# Patient Record
Sex: FEMALE | Race: BLACK OR AFRICAN AMERICAN | NOT HISPANIC OR LATINO | URBAN - METROPOLITAN AREA
[De-identification: names, ages, dates, MRNs, and addresses within clinical notes are randomized per-mention and may not be internally consistent; named-entity substitution may affect disease eponyms.]

---

## 2019-05-17 ENCOUNTER — OFFICE (OUTPATIENT)
Dept: URBAN - METROPOLITAN AREA CLINIC 101 | Facility: CLINIC | Age: 54
End: 2019-05-17

## 2019-05-17 ENCOUNTER — OFFICE (OUTPATIENT)
Dept: URBAN - METROPOLITAN AREA CLINIC 101 | Facility: CLINIC | Age: 54
End: 2019-05-17
Payer: MEDICAID

## 2019-05-17 VITALS
WEIGHT: 234 LBS | SYSTOLIC BLOOD PRESSURE: 127 MMHG | DIASTOLIC BLOOD PRESSURE: 91 MMHG | HEART RATE: 81 BPM | TEMPERATURE: 97.7 F | HEIGHT: 61 IN

## 2019-05-17 DIAGNOSIS — B18.2 CHRONIC VIRAL HEPATITIS C: ICD-10-CM

## 2019-05-17 PROCEDURE — 00002: CPT

## 2019-05-17 PROCEDURE — 99204 OFFICE O/P NEW MOD 45 MIN: CPT

## 2019-05-17 NOTE — SERVICEHPINOTES
MARTINEZ HAMILTON   is a   53   year old    female who is being seen in consultation at the request of   ROSEMARY OQUENDO   for hepatitis C. States she was first otld she had hepatitis C in 1999. She has a long h/o heroin use. However, has been clean for 1 month following an overdose requiring 3 doses of narcan. She states this was a wakeup call and is "never" using again quin[place] due to the stress she caused her family. She denies any alcohol use. Has not had an u/s since dx in 1999. Has never had a liver bx. No prior tx. She denies any abd pain. No h/o dark urine, scleral icterus.4/18/19 hgb-14.1, plt-225, AST/ALT- 61/120, alk phos-158, alb-3.7 BR

## 2019-07-12 LAB
AMBIG ABBREV CMP14 DEFAULT: (no result)
CBC WITH DIFFERENTIAL/PLATELET: BASO (ABSOLUTE): 0 X10E3/UL (ref 0–0.2)
CBC WITH DIFFERENTIAL/PLATELET: BASOS: 0 %
CBC WITH DIFFERENTIAL/PLATELET: EOS (ABSOLUTE): 0.2 X10E3/UL (ref 0–0.4)
CBC WITH DIFFERENTIAL/PLATELET: EOS: 2 %
CBC WITH DIFFERENTIAL/PLATELET: HEMATOCRIT: 41.8 % (ref 34–46.6)
CBC WITH DIFFERENTIAL/PLATELET: HEMATOLOGY COMMENTS: (no result)
CBC WITH DIFFERENTIAL/PLATELET: HEMOGLOBIN: 13.6 G/DL (ref 11.1–15.9)
CBC WITH DIFFERENTIAL/PLATELET: IMMATURE CELLS: (no result)
CBC WITH DIFFERENTIAL/PLATELET: IMMATURE GRANS (ABS): 0 X10E3/UL (ref 0–0.1)
CBC WITH DIFFERENTIAL/PLATELET: IMMATURE GRANULOCYTES: 0 %
CBC WITH DIFFERENTIAL/PLATELET: LYMPHS (ABSOLUTE): 2.2 X10E3/UL (ref 0.7–3.1)
CBC WITH DIFFERENTIAL/PLATELET: LYMPHS: 29 %
CBC WITH DIFFERENTIAL/PLATELET: MCH: 29.1 PG (ref 26.6–33)
CBC WITH DIFFERENTIAL/PLATELET: MCHC: 32.5 G/DL (ref 31.5–35.7)
CBC WITH DIFFERENTIAL/PLATELET: MCV: 89 FL (ref 79–97)
CBC WITH DIFFERENTIAL/PLATELET: MONOCYTES(ABSOLUTE): 0.3 X10E3/UL (ref 0.1–0.9)
CBC WITH DIFFERENTIAL/PLATELET: MONOCYTES: 4 %
CBC WITH DIFFERENTIAL/PLATELET: NEUTROPHILS (ABSOLUTE): 4.9 X10E3/UL (ref 1.4–7)
CBC WITH DIFFERENTIAL/PLATELET: NEUTROPHILS: 65 %
CBC WITH DIFFERENTIAL/PLATELET: NRBC: (no result)
CBC WITH DIFFERENTIAL/PLATELET: PLATELETS: 213 X10E3/UL (ref 150–450)
CBC WITH DIFFERENTIAL/PLATELET: RBC: 4.68 X10E6/UL (ref 3.77–5.28)
CBC WITH DIFFERENTIAL/PLATELET: RDW: 14.5 % (ref 12.3–15.4)
CBC WITH DIFFERENTIAL/PLATELET: WBC: 7.6 X10E3/UL (ref 3.4–10.8)
COMP. METABOLIC PANEL (14): A/G RATIO: 1.1 — LOW (ref 1.2–2.2)
COMP. METABOLIC PANEL (14): ALBUMIN: 3.9 G/DL (ref 3.5–5.5)
COMP. METABOLIC PANEL (14): ALKALINE PHOSPHATASE: 136 IU/L — HIGH (ref 39–117)
COMP. METABOLIC PANEL (14): ALT (SGPT): 80 IU/L — HIGH (ref 0–32)
COMP. METABOLIC PANEL (14): AST (SGOT): 59 IU/L — HIGH (ref 0–40)
COMP. METABOLIC PANEL (14): BILIRUBIN, TOTAL: 0.4 MG/DL (ref 0–1.2)
COMP. METABOLIC PANEL (14): BUN/CREATININE RATIO: 18 (ref 9–23)
COMP. METABOLIC PANEL (14): BUN: 14 MG/DL (ref 6–24)
COMP. METABOLIC PANEL (14): CALCIUM: 9.3 MG/DL (ref 8.7–10.2)
COMP. METABOLIC PANEL (14): CARBON DIOXIDE, TOTAL: 24 MMOL/L (ref 20–29)
COMP. METABOLIC PANEL (14): CHLORIDE: 102 MMOL/L (ref 96–106)
COMP. METABOLIC PANEL (14): CREATININE: 0.8 MG/DL (ref 0.57–1)
COMP. METABOLIC PANEL (14): EGFR IF AFRICN AM: 95 ML/MIN/1.73 (ref 59–?)
COMP. METABOLIC PANEL (14): EGFR IF NONAFRICN AM: 83 ML/MIN/1.73 (ref 59–?)
COMP. METABOLIC PANEL (14): GLOBULIN, TOTAL: 3.6 G/DL (ref 1.5–4.5)
COMP. METABOLIC PANEL (14): GLUCOSE: 115 MG/DL — HIGH (ref 65–99)
COMP. METABOLIC PANEL (14): POTASSIUM: 4.1 MMOL/L (ref 3.5–5.2)
COMP. METABOLIC PANEL (14): PROTEIN, TOTAL: 7.5 G/DL (ref 6–8.5)
COMP. METABOLIC PANEL (14): SODIUM: 139 MMOL/L (ref 134–144)
HBSAG SCREEN: NEGATIVE
HCV RNA BY PCR, QN RFX GENO: HCV GENOTYPE: (no result)
HCV RNA BY PCR, QN RFX GENO: HCV LOG10: 5.46 LOG10 IU/ML
HCV RNA BY PCR, QN RFX GENO: HEPATITIS C QUANTITATION: (no result) IU/ML
HCV RNA BY PCR, QN RFX GENO: TEST INFORMATION: (no result)
HEP B CORE AB, TOT: POSITIVE
HEPATITIS B SURF AB QUANT: 318.5 MIU/ML (ref 9.9–?)
HEPATITIS C GENOTYPE: (no result)
HEPATITIS C GENOTYPE: PLEASE NOTE: (no result)
PANEL 083935: HIV SCREEN 4TH GENERATION WRFX: NON REACTIVE
PROTHROMBIN TIME (PT): INR: 1 (ref 0.8–1.2)
PROTHROMBIN TIME (PT): PROTHROMBIN TIME: 10.4 SEC (ref 9.1–12)

## 2019-07-24 ENCOUNTER — OFFICE (OUTPATIENT)
Dept: URBAN - METROPOLITAN AREA CLINIC 101 | Facility: CLINIC | Age: 54
End: 2019-07-24
Payer: MEDICAID

## 2019-07-24 DIAGNOSIS — B18.2 CHRONIC VIRAL HEPATITIS C: ICD-10-CM

## 2019-07-24 PROCEDURE — 91200 LIVER ELASTOGRAPHY: CPT

## 2019-10-10 ENCOUNTER — OFFICE (OUTPATIENT)
Dept: URBAN - METROPOLITAN AREA CLINIC 101 | Facility: CLINIC | Age: 54
End: 2019-10-10
Payer: MEDICAID

## 2019-10-10 VITALS
HEIGHT: 61 IN | SYSTOLIC BLOOD PRESSURE: 123 MMHG | DIASTOLIC BLOOD PRESSURE: 94 MMHG | TEMPERATURE: 97.9 F | HEART RATE: 81 BPM | WEIGHT: 240 LBS

## 2019-10-10 DIAGNOSIS — R76.8 OTHER SPECIFIED ABNORMAL IMMUNOLOGICAL FINDINGS IN SERUM: ICD-10-CM

## 2019-10-10 DIAGNOSIS — B18.2 CHRONIC VIRAL HEPATITIS C: ICD-10-CM

## 2019-10-10 PROCEDURE — 99214 OFFICE O/P EST MOD 30 MIN: CPT

## 2019-10-10 NOTE — SERVICEHPINOTES
MARTINEZ HAMILTON   is a   54  female who presents for hep c follow up. Initially diagnosed in 1999, she is treatment naïve. She has a long h/o heroin use but clean since April of this year. BRLabs done in July--normal plt, normal albumin, t bili-0.4, alk phos-136, AST/ALT 59/80 HCV 191030, genotype 1b INR-1.0 HBsAb pos, neg HIV, neg HBsAg, positive HBcAb total.FibroScan showing S3, F4 but fasting Fibrosure with F1-F2 (S3).BRAbd u/s 7/2019 with no concerning liver lesions no findings of cirrhosisBR